# Patient Record
Sex: FEMALE | Race: WHITE | NOT HISPANIC OR LATINO | URBAN - METROPOLITAN AREA
[De-identification: names, ages, dates, MRNs, and addresses within clinical notes are randomized per-mention and may not be internally consistent; named-entity substitution may affect disease eponyms.]

---

## 2018-11-07 ENCOUNTER — APPOINTMENT (RX ONLY)
Dept: URBAN - METROPOLITAN AREA CLINIC 166 | Facility: CLINIC | Age: 78
Setting detail: DERMATOLOGY
End: 2018-11-07

## 2018-11-07 DIAGNOSIS — L82.1 OTHER SEBORRHEIC KERATOSIS: ICD-10-CM

## 2018-11-07 DIAGNOSIS — L57.0 ACTINIC KERATOSIS: ICD-10-CM

## 2018-11-07 PROCEDURE — 17000 DESTRUCT PREMALG LESION: CPT

## 2018-11-07 PROCEDURE — ? LIQUID NITROGEN

## 2018-11-07 PROCEDURE — ? COUNSELING

## 2018-11-07 PROCEDURE — 99202 OFFICE O/P NEW SF 15 MIN: CPT | Mod: 25

## 2018-11-07 ASSESSMENT — LOCATION SIMPLE DESCRIPTION DERM
LOCATION SIMPLE: LEFT UPPER BACK
LOCATION SIMPLE: LEFT FOREARM

## 2018-11-07 ASSESSMENT — LOCATION DETAILED DESCRIPTION DERM
LOCATION DETAILED: LEFT MID-UPPER BACK
LOCATION DETAILED: LEFT DISTAL DORSAL FOREARM

## 2018-11-07 ASSESSMENT — LOCATION ZONE DERM
LOCATION ZONE: ARM
LOCATION ZONE: TRUNK

## 2018-11-07 NOTE — HPI: SKIN LESION (SQUAMOUS CELL CARCINOMA)
Is This A New Presentation, Or A Follow-Up?: New Squamous Cell Carcinoma
When Was Squamous Cell Carcinoma Biopsied? (Optional): 8/22/2018

## 2018-11-27 ENCOUNTER — APPOINTMENT (RX ONLY)
Dept: URBAN - METROPOLITAN AREA CLINIC 166 | Facility: CLINIC | Age: 78
Setting detail: DERMATOLOGY
End: 2018-11-27

## 2018-11-27 PROBLEM — Z85.828 PERSONAL HISTORY OF OTHER MALIGNANT NEOPLASM OF SKIN: Status: ACTIVE | Noted: 2018-11-27

## 2018-11-27 PROBLEM — L57.0 ACTINIC KERATOSIS: Status: ACTIVE | Noted: 2018-11-27

## 2018-11-27 PROBLEM — D04.62 CARCINOMA IN SITU OF SKIN OF LEFT UPPER LIMB, INCLUDING SHOULDER: Status: ACTIVE | Noted: 2018-11-27

## 2018-11-27 PROCEDURE — 17262 DSTRJ MAL LES T/A/L 1.1-2.0: CPT

## 2018-11-27 PROCEDURE — ? PATIENT SPECIFIC COUNSELING

## 2018-11-27 PROCEDURE — ? CURETTAGE AND DESTRUCTION

## 2018-11-27 NOTE — PROCEDURE: CURETTAGE AND DESTRUCTION
Bill For Surgical Tray: no
Size Of Lesion After Curettage: 1.1
Consent was obtained from the patient. The risks, benefits and alternatives to therapy were discussed in detail. Specifically, the risks of infection, scarring, bleeding, prolonged wound healing, nerve injury, incomplete removal, allergy to anesthesia and recurrence were addressed. Alternatives to ED&C, such as: surgical removal and XRT were also discussed.  Prior to the procedure, the treatment site was clearly identified and confirmed by the patient. All components of Universal Protocol/PAUSE Rule completed.
Size Of Lesion In Cm: 0.7
What Was Performed First?: Curettage
Number Of Curettages: 3
Cautery Type: electrodesiccation
Total Volume (Ccs): 1
Bill As A Line Item Or As Units: Line Item
Detail Level: Detailed
Post-Care Instructions: I reviewed with the patient in detail post-care instructions. Patient is to keep the area dry for 48 hours, and not to engage in any swimming until the area is healed. Should the patient develop any fevers, chills, bleeding, severe pain patient will contact the office immediately.
Additional Information: (Optional): The wound was cleaned, and a pressure dressing was applied.  The patient received detailed post-op instructions.
Anesthesia Type: 1% lidocaine with epinephrine

## 2021-01-11 ENCOUNTER — APPOINTMENT (RX ONLY)
Dept: URBAN - METROPOLITAN AREA CLINIC 170 | Facility: CLINIC | Age: 81
Setting detail: DERMATOLOGY
End: 2021-01-11

## 2021-01-11 DIAGNOSIS — L85.3 XEROSIS CUTIS: ICD-10-CM

## 2021-01-11 PROBLEM — D48.5 NEOPLASM OF UNCERTAIN BEHAVIOR OF SKIN: Status: ACTIVE | Noted: 2021-01-11

## 2021-01-11 PROCEDURE — ? COUNSELING

## 2021-01-11 PROCEDURE — ? PRESCRIPTION

## 2021-01-11 PROCEDURE — ? TREATMENT REGIMEN

## 2021-01-11 PROCEDURE — ? BIOPSY BY SHAVE METHOD AND DESTRUCTION

## 2021-01-11 PROCEDURE — 17262 DSTRJ MAL LES T/A/L 1.1-2.0: CPT

## 2021-01-11 PROCEDURE — 99212 OFFICE O/P EST SF 10 MIN: CPT | Mod: 25

## 2021-01-11 RX ORDER — HYDROCORTISONE 25 MG/G
CREAM TOPICAL
Qty: 1 | Refills: 1 | Status: ERX | COMMUNITY
Start: 2021-01-11

## 2021-01-11 RX ADMIN — HYDROCORTISONE: 25 CREAM TOPICAL at 00:00

## 2021-01-11 ASSESSMENT — LOCATION SIMPLE DESCRIPTION DERM
LOCATION SIMPLE: RIGHT FOREARM
LOCATION SIMPLE: LEFT FOREARM

## 2021-01-11 ASSESSMENT — LOCATION DETAILED DESCRIPTION DERM
LOCATION DETAILED: LEFT PROXIMAL DORSAL FOREARM
LOCATION DETAILED: RIGHT PROXIMAL DORSAL FOREARM

## 2021-01-11 ASSESSMENT — LOCATION ZONE DERM: LOCATION ZONE: ARM

## 2021-01-11 NOTE — PROCEDURE: TREATMENT REGIMEN
CONSTITUTIONAL: No weakness, fevers or chills  EYES/ENT: No visual changes;  No vertigo or throat pain   RESPIRATORY: No cough, wheezing, hemoptysis; No shortness of breath  CARDIOVASCULAR: No chest pain or palpitations  GASTROINTESTINAL: No abdominal or epigastric pain. No nausea, vomiting, or hematemesis; No diarrhea or constipation. No melena or hematochezia.  GENITOURINARY: No dysuria, frequency or hematuria  NEUROLOGICAL: No numbness or weakness  SKIN: No itching, rashes
CONSTITUTIONAL: Chills, no fever  EYES/ENT: No visual changes;  No vertigo or throat pain   RESPIRATORY: SOB  CARDIOVASCULAR: No chest pain or palpitations  GASTROINTESTINAL: Belly pain  GENITOURINARY: No dysuria, frequency or hematuria  MSK: Low back pain  NEUROLOGICAL: Slurred speech, left facial droop, left sided weakness  SKIN: No itching, rashes
Plan: Keep skin moisturized
Detail Level: Zone
Initiate Treatment: Hydrocortisone cream twice a day to affected areas as needed

## 2021-01-11 NOTE — PROCEDURE: BIOPSY BY SHAVE METHOD AND DESTRUCTION
Detail Level: Detailed
Biopsy Type: H and E
Bill As?: Malignant Destruction
Size Of Lesion In Cm (Optional): 1.4
Size Of Lesion After Curettage: 1.8
Anesthesia Type: 1% lidocaine with epinephrine
Anesthesia Volume In Cc: 0.5
Hemostasis: Drysol
Destruction Type: electrodesiccation
Number Of Curettages: 3
Wound Care: Petrolatum
Lab: [id number]
Lab Facility: [id number]
Render Path Notes In Note?: No
Consent: Written consent was obtained and risks were reviewed including but not limited to scarring, infection, bleeding, scabbing, incomplete removal, nerve damage and allergy to anesthesia.
Post-Care Instructions: I reviewed with the patient in detail post-care instructions. Patient is to keep the biopsy site dry overnight, and then apply bacitracin twice daily until healed. Patient may apply hydrogen peroxide soaks to remove any crusting.
Notification Instructions: Patient will be notified of biopsy results. However, patient instructed to call the office if not contacted within 2 weeks.
Billing Type: Third-Party Bill

## 2022-11-23 ENCOUNTER — OFFICE VISIT (OUTPATIENT)
Dept: URBAN - METROPOLITAN AREA CLINIC 30 | Facility: CLINIC | Age: 82
End: 2022-11-23
Payer: COMMERCIAL

## 2022-11-23 DIAGNOSIS — H16.142 PUNCTATE KERATITIS OF LEFT EYE: ICD-10-CM

## 2022-11-23 DIAGNOSIS — H18.422 BAND KERATOPATHY, LEFT EYE: ICD-10-CM

## 2022-11-23 DIAGNOSIS — H16.223 KERATOCONJUNCTIVITIS SICCA, BILATERAL: Primary | ICD-10-CM

## 2022-11-23 PROCEDURE — 99204 OFFICE O/P NEW MOD 45 MIN: CPT | Performed by: OPTOMETRIST

## 2022-11-23 PROCEDURE — 83861 MICROFLUID ANALY TEARS: CPT | Performed by: OPTOMETRIST

## 2022-11-23 RX ORDER — OFLOXACIN 3 MG/ML
0.3 % SOLUTION/ DROPS OPHTHALMIC
Qty: 5 | Refills: 0 | Status: ACTIVE
Start: 2022-11-23

## 2022-11-23 RX ORDER — PREDNISOLONE ACETATE 10 MG/ML
1 % SUSPENSION/ DROPS OPHTHALMIC
Qty: 10 | Refills: 0 | Status: ACTIVE
Start: 2022-11-23

## 2022-11-23 ASSESSMENT — INTRAOCULAR PRESSURE: OD: 18

## 2022-11-23 NOTE — IMPRESSION/PLAN
Impression: Keratoconjunctivitis sicca, bilateral: E96.461.  
***Sees Dr. Dannielle Mae as primary*** Plan: Chronic Dry Eye. DEC 11/2022. Pt ed of condition that DED tends to be chronic (there is no cure) and will take time to treat based on severity. Discussed in detail ADDE/DANYEL. Recommend blinking exercises, O3's, WCs, and ATs QID OU. Using Restasis bid OU.

## 2022-11-30 ENCOUNTER — OFFICE VISIT (OUTPATIENT)
Dept: URBAN - METROPOLITAN AREA CLINIC 30 | Facility: CLINIC | Age: 82
End: 2022-11-30
Payer: COMMERCIAL

## 2022-11-30 DIAGNOSIS — H18.422 BAND KERATOPATHY, LEFT EYE: ICD-10-CM

## 2022-11-30 DIAGNOSIS — H16.223 KERATOCONJUNCTIVITIS SICCA, BILATERAL: ICD-10-CM

## 2022-11-30 DIAGNOSIS — H16.142 PUNCTATE KERATITIS OF LEFT EYE: Primary | ICD-10-CM

## 2022-11-30 PROCEDURE — 92071 CONTACT LENS FITTING FOR TX: CPT | Performed by: OPTOMETRIST

## 2022-11-30 PROCEDURE — 83861 MICROFLUID ANALY TEARS: CPT | Performed by: OPTOMETRIST

## 2022-11-30 PROCEDURE — 99214 OFFICE O/P EST MOD 30 MIN: CPT | Performed by: OPTOMETRIST

## 2022-11-30 NOTE — IMPRESSION/PLAN
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 Plan: May need EDTA for removal with cornea spec. Monitor.

## 2022-11-30 NOTE — IMPRESSION/PLAN
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 Plan: Again, symptoms much improved. Band K with chronic punctate keratitis. Cont PA 1% tid OS. Cont oflax TID OS while BCL in place. May need cornea consult. Also consider doxy. LSCD. [[BSCL  8.4 B&L in OS]]. BCL missing OS today- replaced. Consider daily disposable CL. Pt has hx of CLW and is able to remove/replace herself. RTC x 3 weeks.

## 2022-11-30 NOTE — IMPRESSION/PLAN
Impression: Keratoconjunctivitis sicca, bilateral: F44.534.  
***Sees Dr. Nilsa Webber as primary***
11/2022 OSMO [phone] Plan: Chronic Dry Eye. Using Systane and Restasis BID OU. Hx of CLW. DEC 11/2022. Pt ed of condition that DED tends to be chronic (there is no cure) and will take time to treat based on severity. Discussed in detail ADDE/DANYEL. Recommend blinking exercises, O3's, WCs, and ATs QID OU.

## 2022-12-20 ENCOUNTER — OFFICE VISIT (OUTPATIENT)
Dept: URBAN - METROPOLITAN AREA CLINIC 30 | Facility: CLINIC | Age: 82
End: 2022-12-20
Payer: COMMERCIAL

## 2022-12-20 DIAGNOSIS — H18.422 BAND KERATOPATHY, LEFT EYE: ICD-10-CM

## 2022-12-20 DIAGNOSIS — H16.142 PUNCTATE KERATITIS OF LEFT EYE: ICD-10-CM

## 2022-12-20 DIAGNOSIS — H16.223 KERATOCONJUNCTIVITIS SICCA, BILATERAL: Primary | ICD-10-CM

## 2022-12-20 PROCEDURE — 99213 OFFICE O/P EST LOW 20 MIN: CPT | Performed by: OPTOMETRIST

## 2022-12-20 PROCEDURE — 83861 MICROFLUID ANALY TEARS: CPT | Performed by: OPTOMETRIST

## 2022-12-20 NOTE — IMPRESSION/PLAN
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 Plan: No pain. Doing well with BCL, removed. Band K with chronic punctate keratitis. Cont PA 1% tid OS- pt misplaced and has only been using the for the past 10 days. D/C oflox TID OS. May need cornea consult. Also consider doxy. LSCD. [[BSCL  8.4 B&L in OS]]. Discussed daily disposable CL. Pt has hx of CLW and is able to remove/replace herself.

## 2022-12-20 NOTE — IMPRESSION/PLAN
Impression: Keratoconjunctivitis sicca, bilateral: Y63.004.  
***Sees Dr. Liborio Tejada as primary***
+Lid Laxity 12/2022 OSMO [phone]
11/2022 OSMO [phone] Plan: Chronic Dry Eye. Using Systane and Restasis BID OS- can use TID OS. Hx of CLW. 

12/2022 Attempted to place plug LLL- resistant to insertion. Consent form signed. DEC 11/2022. Pt ed of condition that DED tends to be chronic (there is no cure) and will take time to treat based on severity. Discussed in detail ADDE/DANYEL. Recommend blinking exercises, O3's, WCs, and ATs QID OU.

## 2022-12-20 NOTE — IMPRESSION/PLAN
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 Plan: Start Rik 128, 5% concentration. May need EDTA for removal with cornea spec. Monitor. F/U with Dr. Tiana Pantoja as scheduled.

## 2023-04-28 ENCOUNTER — OFFICE VISIT (OUTPATIENT)
Dept: URBAN - METROPOLITAN AREA CLINIC 30 | Facility: CLINIC | Age: 83
End: 2023-04-28
Payer: COMMERCIAL

## 2023-04-28 DIAGNOSIS — H18.422 BAND KERATOPATHY, LEFT EYE: ICD-10-CM

## 2023-04-28 DIAGNOSIS — H16.142 PUNCTATE KERATITIS OF LEFT EYE: Primary | ICD-10-CM

## 2023-04-28 DIAGNOSIS — H16.223 KERATOCONJUNCTIVITIS SICCA, BILATERAL: ICD-10-CM

## 2023-04-28 PROCEDURE — 99213 OFFICE O/P EST LOW 20 MIN: CPT | Performed by: OPTOMETRIST

## 2023-04-28 NOTE — IMPRESSION/PLAN
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 Plan: No pain. Band K with chronic punctate keratitis. . D/C oflox TID OS. May need cornea consult. Also consider doxy. LSCD. [[BSCL  8.4 B&L in OS]]. Discussed daily disposable CL. Good response with prev BCL. Pt has hx of CLW and is able to remove/replace herself but defers for now. 

PLAN: Cont PA 1% bid OS

## 2023-04-28 NOTE — IMPRESSION/PLAN
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 Plan: Not using Rik 128, 5% concentration. May need EDTA for removal with cornea spec. Monitor. Currently using PA 1% bid OS- continue.

## 2023-04-28 NOTE — IMPRESSION/PLAN
Impression: Keratoconjunctivitis sicca, bilateral: O22.582.  
***Sees Dr. Mandel Like as primary***
+Lid Laxity Red Starr [phone] 4/2023  Plan: Chronic Dry Eye. Hx of CLW. PLAN: Using Systane and Restasis BID OS- can use TID OS

12/2022 Attempted to place plug LLL- resistant to insertion. Consent form signed. DEC 11/2022. Pt ed of condition that DED tends to be chronic (there is no cure) and will take time to treat based on severity. Discussed in detail ADDE/DANYEL. Recommend blinking exercises, O3's, WCs, and ATs QID OU.

## 2023-09-08 ENCOUNTER — OFFICE VISIT (OUTPATIENT)
Dept: URBAN - METROPOLITAN AREA CLINIC 30 | Facility: CLINIC | Age: 83
End: 2023-09-08
Payer: COMMERCIAL

## 2023-09-08 DIAGNOSIS — H18.422 BAND KERATOPATHY, LEFT EYE: ICD-10-CM

## 2023-09-08 DIAGNOSIS — H16.223 KERATOCONJUNCTIVITIS SICCA, BILATERAL: ICD-10-CM

## 2023-09-08 DIAGNOSIS — H16.142 PUNCTATE KERATITIS, LEFT EYE: Primary | ICD-10-CM

## 2023-09-08 PROCEDURE — 99213 OFFICE O/P EST LOW 20 MIN: CPT | Performed by: OPTOMETRIST

## 2023-09-08 PROCEDURE — 83861 MICROFLUID ANALY TEARS: CPT | Performed by: OPTOMETRIST

## 2023-09-08 RX ORDER — CYCLOSPORINE 0.5 MG/ML
0.05 % EMULSION OPHTHALMIC
Qty: 180 | Refills: 3 | Status: ACTIVE
Start: 2023-09-08

## 2023-09-08 ASSESSMENT — INTRAOCULAR PRESSURE
OD: 13
OS: 17

## 2024-02-01 ENCOUNTER — OFFICE VISIT (OUTPATIENT)
Dept: URBAN - METROPOLITAN AREA CLINIC 30 | Facility: CLINIC | Age: 84
End: 2024-02-01
Payer: COMMERCIAL

## 2024-02-01 DIAGNOSIS — H16.223 KERATOCONJUNCTIVITIS SICCA, BILATERAL: ICD-10-CM

## 2024-02-01 DIAGNOSIS — H16.142 PUNCTATE KERATITIS OF LEFT EYE: Primary | ICD-10-CM

## 2024-02-01 PROCEDURE — 99214 OFFICE O/P EST MOD 30 MIN: CPT | Performed by: OPTOMETRIST

## 2024-02-01 RX ORDER — PREDNISOLONE ACETATE 10 MG/ML
1 % SUSPENSION/ DROPS OPHTHALMIC
Qty: 10 | Refills: 3 | Status: ACTIVE
Start: 2024-02-01

## 2024-02-01 RX ORDER — ERYTHROMYCIN 5 MG/G
OINTMENT OPHTHALMIC
Qty: 3.5 | Refills: 1 | Status: ACTIVE
Start: 2024-02-01

## 2024-02-01 ASSESSMENT — INTRAOCULAR PRESSURE
OD: 15
OS: 15

## 2024-03-25 ENCOUNTER — OFFICE VISIT (OUTPATIENT)
Dept: URBAN - METROPOLITAN AREA CLINIC 30 | Facility: CLINIC | Age: 84
End: 2024-03-25
Payer: COMMERCIAL

## 2024-03-25 DIAGNOSIS — H16.142 PUNCTATE KERATITIS OF LEFT EYE: Primary | ICD-10-CM

## 2024-03-25 DIAGNOSIS — H18.422 BAND KERATOPATHY, LEFT EYE: ICD-10-CM

## 2024-03-25 PROCEDURE — 99213 OFFICE O/P EST LOW 20 MIN: CPT | Performed by: OPTOMETRIST

## 2024-03-25 ASSESSMENT — INTRAOCULAR PRESSURE
OS: 23
OD: 17

## 2024-09-26 ENCOUNTER — OFFICE VISIT (OUTPATIENT)
Dept: URBAN - METROPOLITAN AREA CLINIC 30 | Facility: CLINIC | Age: 84
End: 2024-09-26
Payer: COMMERCIAL

## 2024-09-26 DIAGNOSIS — H18.422 BAND KERATOPATHY, LEFT EYE: ICD-10-CM

## 2024-09-26 DIAGNOSIS — H16.142 PUNCTATE KERATITIS OF LEFT EYE: Primary | ICD-10-CM

## 2024-09-26 PROCEDURE — 99214 OFFICE O/P EST MOD 30 MIN: CPT | Performed by: OPTOMETRIST

## 2024-09-26 RX ORDER — ATROPINE SULFATE 10 MG/ML
1 % SOLUTION OPHTHALMIC
Qty: 5 | Refills: 5 | Status: ACTIVE
Start: 2024-09-26

## 2024-09-26 RX ORDER — SODIUM CHLORIDE 50 MG/G
5 % OINTMENT OPHTHALMIC
Qty: 1 | Refills: 12 | Status: ACTIVE
Start: 2024-09-26